# Patient Record
Sex: MALE | Race: BLACK OR AFRICAN AMERICAN | Employment: PART TIME | URBAN - METROPOLITAN AREA
[De-identification: names, ages, dates, MRNs, and addresses within clinical notes are randomized per-mention and may not be internally consistent; named-entity substitution may affect disease eponyms.]

---

## 2019-11-05 ENCOUNTER — APPOINTMENT (OUTPATIENT)
Dept: GENERAL RADIOLOGY | Facility: HOSPITAL | Age: 19
End: 2019-11-05
Attending: NURSE PRACTITIONER
Payer: OTHER MISCELLANEOUS

## 2019-11-05 ENCOUNTER — HOSPITAL ENCOUNTER (EMERGENCY)
Facility: HOSPITAL | Age: 19
Discharge: HOME | End: 2019-11-05
Payer: OTHER MISCELLANEOUS

## 2019-11-05 VITALS
DIASTOLIC BLOOD PRESSURE: 85 MMHG | SYSTOLIC BLOOD PRESSURE: 144 MMHG | RESPIRATION RATE: 20 BRPM | TEMPERATURE: 98 F | HEART RATE: 88 BPM | OXYGEN SATURATION: 100 %

## 2019-11-05 DIAGNOSIS — Z77.098 CHEMICAL EXPOSURE: Primary | ICD-10-CM

## 2019-11-05 PROCEDURE — 96360 HYDRATION IV INFUSION INIT: CPT

## 2019-11-05 PROCEDURE — 71046 X-RAY EXAM CHEST 2 VIEWS: CPT | Performed by: NURSE PRACTITIONER

## 2019-11-05 PROCEDURE — 80048 BASIC METABOLIC PNL TOTAL CA: CPT | Performed by: NURSE PRACTITIONER

## 2019-11-05 PROCEDURE — 85025 COMPLETE CBC W/AUTO DIFF WBC: CPT | Performed by: NURSE PRACTITIONER

## 2019-11-05 PROCEDURE — 99284 EMERGENCY DEPT VISIT MOD MDM: CPT

## 2019-11-05 NOTE — ED INITIAL ASSESSMENT (HPI)
Patient states he was at work today, and inhaled bleach fumes three times. Now he feels nauseous with a pounding headache. Patient also felt it was important that we know he has ruth up for the last 24 hours.

## 2019-11-05 NOTE — ED PROVIDER NOTES
Patient Seen in: [facility] AND [facility] Emergency Department    History   Patient presents with:  Exposure,Chem Occupational (infectious)    Stated Complaint: chemical exposure    HPI  Pt c/o a 10/10 headache that began after inhailing bleach fumes for 3 livia EOMI. CN II - XII grossly intact. No gross motor deficits. 5/5 strength in all distribution. Sensation fully intact.       DDX to include tension headache vs. Migraine headache vs. Sinusitis vs. [facility]        ED Course     Labs Reviewed   BASIC METABOLIC PA

## 2019-11-05 NOTE — ED NOTES
Pt states incident occurred at 1000 this morning, pt states it was a bleach spray bottle, sprayed and breathed in fumes x3. Pt RR even and unlabored, in NAD.  Pt states he vomited x1 after event, states emesis was green, pt states he hasn't eaten today, pt

## 2020-02-15 ENCOUNTER — HOSPITAL ENCOUNTER (EMERGENCY)
Facility: HOSPITAL | Age: 20
Discharge: HOME | End: 2020-02-15
Attending: EMERGENCY MEDICINE
Payer: MEDICAID

## 2020-02-15 ENCOUNTER — APPOINTMENT (OUTPATIENT)
Dept: GENERAL RADIOLOGY | Facility: HOSPITAL | Age: 20
End: 2020-02-15
Attending: EMERGENCY MEDICINE
Payer: MEDICAID

## 2020-02-15 VITALS
RESPIRATION RATE: 16 BRPM | WEIGHT: 314 LBS | HEIGHT: 73 IN | TEMPERATURE: 98 F | DIASTOLIC BLOOD PRESSURE: 60 MMHG | SYSTOLIC BLOOD PRESSURE: 118 MMHG | HEART RATE: 76 BPM | BODY MASS INDEX: 41.62 KG/M2 | OXYGEN SATURATION: 96 %

## 2020-02-15 DIAGNOSIS — S83.91XA SPRAIN OF RIGHT KNEE, UNSPECIFIED LIGAMENT, INITIAL ENCOUNTER: Primary | ICD-10-CM

## 2020-02-15 PROCEDURE — 99283 EMERGENCY DEPT VISIT LOW MDM: CPT

## 2020-02-15 PROCEDURE — 73560 X-RAY EXAM OF KNEE 1 OR 2: CPT | Performed by: EMERGENCY MEDICINE

## 2020-02-15 RX ORDER — IBUPROFEN 800 MG/1
800 TABLET ORAL ONCE
Status: COMPLETED | OUTPATIENT
Start: 2020-02-15 | End: 2020-02-15

## 2020-02-15 RX ORDER — HYDROCODONE BITARTRATE AND ACETAMINOPHEN 5; 325 MG/1; MG/1
1 TABLET ORAL ONCE
Status: COMPLETED | OUTPATIENT
Start: 2020-02-15 | End: 2020-02-15

## 2020-02-15 RX ORDER — IBUPROFEN 600 MG/1
600 TABLET ORAL EVERY 8 HOURS PRN
Qty: 30 TABLET | Refills: 0 | Status: SHIPPED | OUTPATIENT
Start: 2020-02-15 | End: 2020-02-22

## 2020-02-15 NOTE — ED PROVIDER NOTES
Patient Seen in: [facility] AND [facility] Emergency Department      History   Patient presents with:  Lower Extremity Injury    Stated Complaint: right knee pain    HPI    70-year-old male with past medical history significant for depression presents to the [facility] Musculoskeletal: Right knee with normal range of motion. No deformity. Tenderness to palpation over the patella. Normal distal cap refill and sensation  Lymphadenopathy: No sig cervical LAD   Neurological: Awake, alert. Normal reflexes.  No cranial nerv List    START taking these medications    ibuprofen 600 MG Oral Tab  Take 1 tablet (600 mg total) by mouth every 8 (eight) hours as needed for Pain or Fever.   Qty: 30 tablet Refills: 0

## 2020-02-15 NOTE — ED INITIAL ASSESSMENT (HPI)
Pt arrive in ER per [facility] ambulance with c/o right knee pain r/t fall, cms intact, no deformity noted, denies loc

## 2020-02-15 NOTE — ED NOTES
Pt dcd to home aox3, per wheelchair to ER waiting room, discharge instruction given and voices understanding, prescription given, denies any concern

## (undated) NOTE — ED AVS SNAPSHOT
Domingo Carpio   MRN: [id number]    Department:  [facility] Emergency Department   Date of Visit:  11/5/2019           Disclosure     Insurance plans vary and the physician(s) referred by the ER may not be covered by your plan.  Please contact CARE PHYSICIAN AT ONCE OR RETURN IMMEDIATELY TO THE EMERGENCY DEPARTMENT. If you have been prescribed any medication(s), please fill your prescription right away and begin taking the medication(s) as directed.   If you believe that any of the medications

## (undated) NOTE — LETTER
Date & Time: 2/15/2020, 6:09 AM  Patient: Nataliia Thomas. Encounter Provider(s):    Brooklyn Alfredo MD       To Whom It May Concern:    Gina Thurston was seen and treated in our department on 2/15/2020. He should not return to work after 2 to 3 days.

## (undated) NOTE — ED AVS SNAPSHOT
Alysha Noriega. MRN: [id number]    Department:  [facility] Emergency Department   Date of Visit:  2/15/2020           Disclosure     Insurance plans vary and the physician(s) referred by the ER may not be covered by your plan.  Please contac CARE PHYSICIAN AT ONCE OR RETURN IMMEDIATELY TO THE EMERGENCY DEPARTMENT. If you have been prescribed any medication(s), please fill your prescription right away and begin taking the medication(s) as directed.   If you believe that any of the medications